# Patient Record
Sex: MALE | Race: WHITE
[De-identification: names, ages, dates, MRNs, and addresses within clinical notes are randomized per-mention and may not be internally consistent; named-entity substitution may affect disease eponyms.]

---

## 2018-02-02 ENCOUNTER — HOSPITAL ENCOUNTER (OUTPATIENT)
Dept: HOSPITAL 45 - C.MRIBC | Age: 78
Discharge: HOME | End: 2018-02-02
Attending: PSYCHIATRY & NEUROLOGY
Payer: COMMERCIAL

## 2018-02-02 DIAGNOSIS — R41.3: Primary | ICD-10-CM

## 2018-02-02 NOTE — DIAGNOSTIC IMAGING REPORT
MRI OF THE BRAIN WITHOUT AND WITH IV CONTRAST



CLINICAL HISTORY: MEMORY LOSS    



COMPARISON STUDY:  No previous studies for comparison. 



TECHNIQUE: MRI of the brain was performed from the vertex to the skull base

utilizing various T1 and T2 weighted sequences. Following the IV administration

of 9.5 mL of Gadavist contrast, additional enhanced images were obtained.



FINDINGS: 

Sagittal T1, axial diffusion, proton density and T2 weighted axial, coronal

FLAIR, and pre and post axial T1-weighted images were acquired. These were

supplemented with post gadolinium coronal T1 weighted images. 

No intra or extra-axial mass lesions are visualized.

Axial diffusion-weighted images reveal no evidence of acute or subacute

infarction.

There is no evidence of ventricular dilatation.

Proton density T2-weighted and FLAIR images reveal minor foci of increased T2

signal within the white matter, likely on a small vessel basis.

There are no abnormal flow voids.

There is mild particular prominence in keeping with the degree of volume loss.





IMPRESSION:  No acute intracranial findings. No evidence of intracranial mass.

No evidence of acute or subacute infarction.







Electronically signed by:  Bang Ewing M.D.

2/2/2018 8:44 AM



Dictated Date/Time:  2/2/2018 8:42 AM

## 2018-05-05 ENCOUNTER — HOSPITAL ENCOUNTER (INPATIENT)
Dept: HOSPITAL 45 - C.EDB | Age: 78
LOS: 4 days | Discharge: NURSING, REHAB OR LONG-TERM CARE | DRG: 884 | End: 2018-05-09
Attending: HOSPITALIST | Admitting: FAMILY MEDICINE
Payer: COMMERCIAL

## 2018-05-05 VITALS
BODY MASS INDEX: 27.41 KG/M2 | HEIGHT: 73 IN | BODY MASS INDEX: 27.41 KG/M2 | WEIGHT: 206.79 LBS | WEIGHT: 206.79 LBS | HEIGHT: 73 IN

## 2018-05-05 DIAGNOSIS — I10: ICD-10-CM

## 2018-05-05 DIAGNOSIS — Z79.899: ICD-10-CM

## 2018-05-05 DIAGNOSIS — F03.91: Primary | ICD-10-CM

## 2018-05-05 DIAGNOSIS — E78.5: ICD-10-CM

## 2018-05-05 DIAGNOSIS — E03.9: ICD-10-CM

## 2018-05-05 DIAGNOSIS — R45.1: ICD-10-CM

## 2018-05-05 DIAGNOSIS — R33.9: ICD-10-CM

## 2018-05-05 DIAGNOSIS — Z59.1: ICD-10-CM

## 2018-05-05 DIAGNOSIS — Z91.83: ICD-10-CM

## 2018-05-05 LAB
ALBUMIN SERPL-MCNC: 3.8 GM/DL (ref 3.4–5)
ALP SERPL-CCNC: 87 U/L (ref 45–117)
ALT SERPL-CCNC: 22 U/L (ref 12–78)
AST SERPL-CCNC: 26 U/L (ref 15–37)
BASOPHILS # BLD: 0.02 K/UL (ref 0–0.2)
BASOPHILS NFR BLD: 0.3 %
BUN SERPL-MCNC: 11 MG/DL (ref 7–18)
CALCIUM SERPL-MCNC: 8.4 MG/DL (ref 8.5–10.1)
CO2 SERPL-SCNC: 28 MMOL/L (ref 21–32)
CREAT SERPL-MCNC: 1.08 MG/DL (ref 0.6–1.4)
EOS ABS #: 0.16 K/UL (ref 0–0.5)
EOSINOPHIL NFR BLD AUTO: 196 K/UL (ref 130–400)
GLUCOSE SERPL-MCNC: 119 MG/DL (ref 70–99)
HCT VFR BLD CALC: 41 % (ref 42–52)
HGB BLD-MCNC: 14 G/DL (ref 14–18)
IG#: 0.01 K/UL (ref 0–0.02)
IMM GRANULOCYTES NFR BLD AUTO: 16.5 %
LYMPHOCYTES # BLD: 1.16 K/UL (ref 1.2–3.4)
MCH RBC QN AUTO: 31.7 PG (ref 25–34)
MCHC RBC AUTO-ENTMCNC: 34.1 G/DL (ref 32–36)
MCV RBC AUTO: 92.8 FL (ref 80–100)
MONO ABS #: 0.79 K/UL (ref 0.11–0.59)
MONOCYTES NFR BLD: 11.3 %
NEUT ABS #: 4.88 K/UL (ref 1.4–6.5)
NEUTROPHILS # BLD AUTO: 2.3 %
NEUTROPHILS NFR BLD AUTO: 69.5 %
PMV BLD AUTO: 10.6 FL (ref 7.4–10.4)
POTASSIUM SERPL-SCNC: 3.5 MMOL/L (ref 3.5–5.1)
PROT SERPL-MCNC: 7.2 GM/DL (ref 6.4–8.2)
RED CELL DISTRIBUTION WIDTH CV: 12.9 % (ref 11.5–14.5)
RED CELL DISTRIBUTION WIDTH SD: 44.1 FL (ref 36.4–46.3)
SODIUM SERPL-SCNC: 139 MMOL/L (ref 136–145)
WBC # BLD AUTO: 7.02 K/UL (ref 4.8–10.8)

## 2018-05-05 SDOH — ECONOMIC STABILITY - HOUSING INSECURITY: INADEQUATE HOUSING: Z59.1

## 2018-05-05 NOTE — DIAGNOSTIC IMAGING REPORT
HEAD WITHOUT CONTRAST (CT)



CLINICAL HISTORY: 77 years-old Male presenting with AMS. 



TECHNIQUE: Multidetector CT imaging of the head was performed without the use of

intravenous contrast. IV contrast: None. A dose lowering technique was used

consistent with the principles of ALARA (as low as reasonably achievable). 



COMPARISON: MR brain from 2/2/2018.



CT DOSE (mGy.cm): The estimated cumulative dose is 687.98 mGy.cm.



FINDINGS: 



 topogram: Unremarkable.



Proportional ventricular and sulcal prominence, likely age-related parenchymal

volume loss. Brain parenchyma normal in appearance with preserved gray-white

differentiation. No mass effect or midline shift. No hemorrhage or acute

territorial infarct. No extra-axial fluid collection. Paranasal sinuses and

mastoid air cells clear. Calvarium intact.    



IMPRESSION:

1.  No acute intracranial abnormality. 







Electronically signed by:  Shaheed Richards M.D.

5/5/2018 7:51 PM



Dictated Date/Time:  5/5/2018 7:49 PM

## 2018-05-05 NOTE — EMERGENCY ROOM VISIT NOTE
History


Report prepared by Ewa:  Shreya Sullivan


Under the Supervision of:  Dr. Cinthia Sexton M.D.


First contact with patient:  17:58


Chief Complaint:  OTHER COMPLAINT


Stated Complaint:  EAR ACHE/PLACEMENT ISSUES





History of Present Illness


The patient is a 77 year old male who presents to the Emergency Room with 

complaints of an episode of placement issues occurring prior to arrival. Per 

the charge nurse, the patient's wife told her over the phone that he has 

dementia, worsening over 4 years. She states that the wife told her that she is 

on vacation for a few weeks in [place]. She reports that the wife said that she 

has cameras throughout the house to watch the patient each day. She states that 

the wife reported the patient goes to the [employer] every day and 

her brother in law checks up on him daily. Reportedly meals are delivered to 

him by his brother-in-law or neighbor twice daily. The charge nurse states that 

the wife said it usual for him to not know where he lives and only his name. 

She states that the wife reported that the patient had a MRI done several 

months ago that showed no significant changes and no improvement. She reports 

that the wife states that she will call the brother-in-law to come and get him. 

The charge nurse reports that the patient was found wandering in the street. 

The patient states that he feels good and nothing happened today. He states 

that it was a good day. He reports that he does not remember wandering the 

streets, but does remember talking to someone across the road. He notes that 

his wife is in "[place]."





   Source of History:  patient, nursing staff


   History Limited By:  dementia


   Onset:  prior to arrival


   Position:  other (global)


   Quality:  other (placement issues)


   Timing:  other (episode)





Review of Systems


See HPI for pertinent positives & negatives. A total of 10 systems reviewed and 

were otherwise negative.





Past Medical & Surgical


Medical Problems:


(1) History of dementia


(2) uti urinry retension, 


(3) uti urinry retension, 








Family History





Patient reports no known family medical history.





Social History


Smoking Status:  Never Smoker


Marital Status:  


Housing Status:  lives with significant other


Occupation Status:  retired





Current/Historical Medications


Scheduled


Amlodipine (Norvasc), 5 MG PO DAILY


Atorvastatin (Lipitor), 20 MG PO DAILY


Furosemide (Lasix), 40 MG PO DAILY


Tamsulosin Hcl (Flomax), 0.4 MG PO DAILY





Allergies


Coded Allergies:  


     No Known Allergies (Unverified , 5/5/18)





Physical Exam


Vital Signs











  Date Time  Temp Pulse Resp B/P (MAP) Pulse Ox O2 Delivery O2 Flow Rate FiO2


 


5/5/18 22:14  64 18 205/106 97 Room Air  


 


5/5/18 20:06  64      


 


5/5/18 19:55  62 18 179/102 99 Room Air  


 


5/5/18 19:03  68 18 225/118 97 Room Air  


 


5/5/18 17:15 36.6 72 16 204/107 96 Room Air  











Physical Exam


Vital signs reviewed.





General: urinating the garbage can, otherwise well-appearing, in no significant 

distress.





HEENT: No scleral icterus, PERRLA, neck supple.  Atraumatic.





Cardiovascular: Regular rate and rhythm, no extra sounds.





Pulmonary: Clear to auscultation bilaterally, normal work of breathing.





Abdomen: Soft, nontender, nondistended, positive bowel sounds.





Musculoskeletal: Atraumatic, no peripheral edema.





Neurologic: Pleasantly confused.  Oriented only to person.  Follows commands. 

Unable to give details preceding visit. Denies any complaints at this time.





Skin: Warm, dry, no rash





Medical Decision & Procedures


ER Provider


Diagnostic Interpretation:


Radiology results as stated below per my review and radiologist interpretation:





HEAD WITHOUT CONTRAST (CT)





CLINICAL HISTORY: 77 years-old Male presenting with AMS. 





TECHNIQUE: Multidetector CT imaging of the head was performed without the use of


intravenous contrast. IV contrast: None. A dose lowering technique was used


consistent with the principles of ALARA (as low as reasonably achievable). 





COMPARISON: MR brain from 2/2/2018.





CT DOSE (mGy.cm): The estimated cumulative dose is 687.98 mGy.cm.





FINDINGS: 





 topogram: Unremarkable.





Proportional ventricular and sulcal prominence, likely age-related parenchymal


volume loss. Brain parenchyma normal in appearance with preserved gray-white


differentiation. No mass effect or midline shift. No hemorrhage or acute


territorial infarct. No extra-axial fluid collection. Paranasal sinuses and


mastoid air cells clear. Calvarium intact.    





IMPRESSION:


1.  No acute intracranial abnormality. 











Electronically signed by:  Shaheed Richards M.D.


5/5/2018 7:51 PM





Dictated Date/Time:  5/5/2018 7:49 PM





CHEST 2 VIEWS ROUTINE





CLINICAL HISTORY: 77 years-old Male presenting with AMS. 





TECHNIQUE: PA and lateral views of the chest were obtained.





COMPARISON: None.





FINDINGS:


Atherosclerosis of the aortic arch. Tortuosity of the descending thoracic aorta.


Cardiac silhouette normal in size. Lungs and pleural spaces clear. Degenerative


changes of the thoracic spine. Upper abdomen normal.





IMPRESSION:


1.  No acute cardiopulmonary disease.











Electronically signed by:  Shaheed Richards M.D.


5/5/2018 9:05 PM





Dictated Date/Time:  5/5/2018 9:04 PM





Laboratory Results


5/5/18 18:46








Red Blood Count 4.42, Mean Corpuscular Volume 92.8, Mean Corpuscular Hemoglobin 

31.7, Mean Corpuscular Hemoglobin Concent 34.1, Mean Platelet Volume 10.6, 

Neutrophils (%) (Auto) 69.5, Lymphocytes (%) (Auto) 16.5, Monocytes (%) (Auto) 

11.3, Eosinophils (%) (Auto) 2.3, Basophils (%) (Auto) 0.3, Neutrophils # (Auto

) 4.88, Lymphocytes # (Auto) 1.16, Monocytes # (Auto) 0.79, Eosinophils # (Auto

) 0.16, Basophils # (Auto) 0.02





5/5/18 18:46

















Test


  5/5/18


17:18 5/5/18


18:46


 


Urine Color YELLOW  


 


Urine Appearance CLEAR (CLEAR)  


 


Urine pH 7.5 (4.5-7.5)  


 


Urine Specific Gravity


  1.009


(1.000-1.030) 


 


 


Urine Protein NEG (NEG)  


 


Urine Glucose (UA) NEG (NEG)  


 


Urine Ketones NEG (NEG)  


 


Urine Occult Blood NEG (NEG)  


 


Urine Nitrite NEG (NEG)  


 


Urine Bilirubin NEG (NEG)  


 


Urine Urobilinogen NEG (NEG)  


 


Urine Leukocyte Esterase MODERATE (NEG)  


 


Urine WBC (Auto) >30 /hpf (0-5)  


 


Urine RBC (Auto) 0-4 /hpf (0-4)  


 


Urine Hyaline Casts (Auto) 1-5 /lpf (0-5)  


 


Urine Epithelial Cells (Auto) 0-5 /lpf (0-5)  


 


Urine Bacteria (Auto) NEG (NEG)  


 


White Blood Count


  


  7.02 K/uL


(4.8-10.8)


 


Red Blood Count


  


  4.42 M/uL


(4.7-6.1)


 


Hemoglobin


  


  14.0 g/dL


(14.0-18.0)


 


Hematocrit  41.0 % (42-52) 


 


Mean Corpuscular Volume


  


  92.8 fL


()


 


Mean Corpuscular Hemoglobin


  


  31.7 pg


(25-34)


 


Mean Corpuscular Hemoglobin


Concent 


  34.1 g/dl


(32-36)


 


Platelet Count


  


  196 K/uL


(130-400)


 


Mean Platelet Volume


  


  10.6 fL


(7.4-10.4)


 


Neutrophils (%) (Auto)  69.5 % 


 


Lymphocytes (%) (Auto)  16.5 % 


 


Monocytes (%) (Auto)  11.3 % 


 


Eosinophils (%) (Auto)  2.3 % 


 


Basophils (%) (Auto)  0.3 % 


 


Neutrophils # (Auto)


  


  4.88 K/uL


(1.4-6.5)


 


Lymphocytes # (Auto)


  


  1.16 K/uL


(1.2-3.4)


 


Monocytes # (Auto)


  


  0.79 K/uL


(0.11-0.59)


 


Eosinophils # (Auto)


  


  0.16 K/uL


(0-0.5)


 


Basophils # (Auto)


  


  0.02 K/uL


(0-0.2)


 


RDW Standard Deviation


  


  44.1 fL


(36.4-46.3)


 


RDW Coefficient of Variation


  


  12.9 %


(11.5-14.5)


 


Immature Granulocyte % (Auto)  0.1 % 


 


Immature Granulocyte # (Auto)


  


  0.01 K/uL


(0.00-0.02)


 


Anion Gap


  


  3.0 mmol/L


(3-11)


 


Estimated GFR (


American) 


  77.4 


 


 


Estimated GFR (Non-


American 


  66.8 


 


 


BUN/Creatinine Ratio  10.2 (10-20) 


 


Calcium Level


  


  8.4 mg/dl


(8.5-10.1)


 


Magnesium Level


  


  2.3 mg/dl


(1.8-2.4)


 


Total Bilirubin


  


  0.6 mg/dl


(0.2-1)


 


Direct Bilirubin


  


  0.2 mg/dl


(0-0.2)


 


Aspartate Amino Transf


(AST/SGOT) 


  26 U/L (15-37) 


 


 


Alanine Aminotransferase


(ALT/SGPT) 


  22 U/L (12-78) 


 


 


Alkaline Phosphatase


  


  87 U/L


()


 


Total Creatine Kinase


  


  340 U/L


()


 


Troponin I


  


  < 0.015 ng/ml


(0-0.045)


 


Total Protein


  


  7.2 gm/dl


(6.4-8.2)


 


Albumin


  


  3.8 gm/dl


(3.4-5.0)





Laboratory results per my review.





Medications Administered











 Medications


  (Trade)  Dose


 Ordered  Sig/Elder


 Route  Start Time


 Stop Time Status Last Admin


Dose Admin


 


 Ceftriaxone Sodium


  (Rocephin Inj)  1 gm  NOW  STAT


 IV  5/5/18 20:00


 5/5/18 20:01 DC 5/5/18 20:23


1 GM











ECG Per My Interpretation


Indication:  altered mental status


Rate (beats per minute):  62


Rhythm:  normal sinus


Findings:  no acute ischemic change, other (qt-c [id number])





ED Course


1804: Past medical records reviewed. The patient was evaluated in room B11A. A 

complete history and physical examination was performed. 





2000: Ordered Rocephin Inj 1 gm IV. 





2001: I reevaluated the patient and he is doing okay.





2038: The office of ageing states that they do not do placements and won't do 

that for this patient because they have no prior cases with them. 





2118: I reviewed the patient's case with Dr. Armand GALAVIZ.  She will evaluate 

the patient for further management.





Medical Decision


Differential diagnosis:


Etiologies such as metabolic, infection, hypoglycemia, electrolyte abnormalities

, cardiac sources, intracerebral event, toxicologic, neurologic, as well as 

others were entertained.





This patient was evaluated and appeared to be in no significant distress.  

Physical examination is fairly unrevealing.  The patient is fairly confused.  

Nursing staff has been in contact with the patient's wife in [place] several 

times.  Neighbors have arrived at the patient's bedside, but they state they 

are not intimately involved with the patient.  Patient's urinalysis is 

concerning for infection will be sent for culture.  He was given 1 g of IV 

ceftriaxone.  [facility] was contacted but they are unable to arrange 

respite care tonight.  Our  did contact several nursing facilities 

but as it is Saturday night, they were unsuccessful in making arrangements.  

The hospitalist, Dr. Roach was contacted and will evaluate the patient 

for further management.





Medication Reconcilliation


Current Medication List:  was personally reviewed by me





Blood Pressure Screening


Patient's blood pressure:  Elevated blood pressure


Will be further monitored by the hospitalist.





Consults


Time Called:  2112


Consulting Physician:  Dr. Armand GALAVIZ


Returned Call:  [phone]


I reviewed the patient's case with Dr. Armand GALAVIZ.  She will evaluate the 

patient for further management.





Impression





 Primary Impression:  


 Dementia


 Additional Impressions:  


 UTI (urinary tract infection)


 Unsatisfactory living conditions





Scribe Attestation


The scribe's documentation has been prepared under my direction and personally 

reviewed by me in its entirety. I confirm that the note above accurately 

reflects all work, treatment, procedures, and medical decision making performed 

by me.





Departure Information


Dispostion


Being Evaluated By Hospitalist





Referrals


Abundio Frost D.O. (PCP)





Patient Instructions


My [facility]





Problem Qualifiers

## 2018-05-05 NOTE — HISTORY AND PHYSICAL
History & Physical


Date & Time of Service:


May 5, 2018 at 23:07


Chief Complaint:


Ear Ache/Placement Issues


Primary Care Physician:


Abundio Frost D.OJeevan


History of Present Illness


Source:  clinic records, hospital records


77-year-old male with a past medical history of hypertension, hypothyroidism, 

dementia was brought to the ER after he was found wandering on the streets.


The patient has a history of dementia and is not oriented to time or place.  He 

is only oriented to self.


Per ER notes, the patient's wife who is currently in [place] had spoken to the 

charge nurse and stated that the patient has a history of dementia.  Apparently

, she is in [place] for vacation and had set up cameras in her home to check on 

her .  She had meals delivered to him by his brother-in-law or neighbor 

and had somebody check on him every day.


The patient is unable to answer any questions as to where he is or where his 

wife is.





Past Medical/Surgical History


Medical Problems:


(1) History of dementia








Family History





Patient reports no known family medical history.





Social History


Smoking Status:  Never Smoker


Marital Status:  


Occupational Status:  retired





Immunizations


History of Influenza Vaccine:  Unknown


History of Tetanus Vaccine?:  Unknown


History of Pneumococcal:  Unknown


History of Hepatitis B Vaccine:  Unknown





Allergies


Coded Allergies:  


     No Known Allergies (Unverified , 5/5/18)





Home Medications


Scheduled


Amlodipine (Norvasc), 5 MG PO DAILY


Atorvastatin (Lipitor), 20 MG PO DAILY


Furosemide (Lasix), 40 MG PO DAILY


Tamsulosin Hcl (Flomax), 0.4 MG PO DAILY





Review of Systems


Constitutional:  No fever, No chills


Eyes:  No worsening of vision


ENT:  No hearing loss


Respiratory:  No cough


Cardiovascular:  No chest pain


Abdomen:  No pain, No nausea, No vomiting


Musculoskeletal:  No joint pain


Genitourinary - Male:  No hematuria, No dysuria


Neurologic:  + memory loss


Psychiatric:  No depression symptoms


Endocrine:  No fatigue


Hematologic / Lymphatic:  No abnormal bleeding/bruising


Integumentary:  No rash





Physical Exam


Vital Signs











  Date Time  Temp Pulse Resp B/P (MAP) Pulse Ox O2 Delivery O2 Flow Rate FiO2


 


5/5/18 22:14  64 18 205/106 97 Room Air  


 


5/5/18 20:06  64      


 


5/5/18 19:55  62 18 179/102 99 Room Air  


 


5/5/18 19:03  68 18 225/118 97 Room Air  


 


5/5/18 17:15 36.6 72 16 204/107 96 Room Air  








General Appearance:  WD/WN, no apparent distress


ENT:  hearing grossly normal


Neck:  supple


Respiratory/Chest:  lungs clear, normal breath sounds, no respiratory distress


Cardiovascular:  regular rate, rhythm


Abdomen/GI:  normal bowel sounds, non tender, soft


Extremities/Musculoskelatal:  no pedal edema


Neurologic/Psych:  alert


Skin:  normal color





Diagnostics


Laboratory Results





Results Past 24 Hours








Test


  5/5/18


17:18 5/5/18


18:46 Range/Units


 


 


Urine Color YELLOW   


 


Urine Appearance CLEAR  CLEAR  


 


Urine pH 7.5  4.5-7.5  


 


Urine Specific Gravity 1.009  1.000-1.030  


 


Urine Protein NEG  NEG  


 


Urine Glucose (UA) NEG  NEG  


 


Urine Ketones NEG  NEG  


 


Urine Occult Blood NEG  NEG  


 


Urine Nitrite NEG  NEG  


 


Urine Bilirubin NEG  NEG  


 


Urine Urobilinogen NEG  NEG  


 


Urine Leukocyte Esterase MODERATE  NEG  


 


Urine WBC (Auto) >30  0-5  /hpf


 


Urine RBC (Auto) 0-4  0-4  /hpf


 


Urine Hyaline Casts (Auto) 1-5  0-5  /lpf


 


Urine Epithelial Cells (Auto) 0-5  0-5  /lpf


 


Urine Bacteria (Auto) NEG  NEG  


 


White Blood Count  7.02 4.8-10.8  K/uL


 


Red Blood Count  4.42 4.7-6.1  M/uL


 


Hemoglobin  14.0 14.0-18.0  g/dL


 


Hematocrit  41.0 42-52  %


 


Mean Corpuscular Volume  92.8   fL


 


Mean Corpuscular Hemoglobin  31.7 25-34  pg


 


Mean Corpuscular Hemoglobin


Concent 


  34.1


  32-36  g/dl


 


 


Platelet Count  196 130-400  K/uL


 


Mean Platelet Volume  10.6 7.4-10.4  fL


 


Neutrophils (%) (Auto)  69.5  %


 


Lymphocytes (%) (Auto)  16.5  %


 


Monocytes (%) (Auto)  11.3  %


 


Eosinophils (%) (Auto)  2.3  %


 


Basophils (%) (Auto)  0.3  %


 


Neutrophils # (Auto)  4.88 1.4-6.5  K/uL


 


Lymphocytes # (Auto)  1.16 1.2-3.4  K/uL


 


Monocytes # (Auto)  0.79 0.11-0.59  K/uL


 


Eosinophils # (Auto)  0.16 0-0.5  K/uL


 


Basophils # (Auto)  0.02 0-0.2  K/uL


 


RDW Standard Deviation  44.1 36.4-46.3  fL


 


RDW Coefficient of Variation  12.9 11.5-14.5  %


 


Immature Granulocyte % (Auto)  0.1  %


 


Immature Granulocyte # (Auto)  0.01 0.00-0.02  K/uL


 


Sodium Level  139 136-145  mmol/L


 


Potassium Level  3.5 3.5-5.1  mmol/L


 


Chloride Level  108   mmol/L


 


Carbon Dioxide Level  28 21-32  mmol/L


 


Anion Gap  3.0 3-11  mmol/L


 


Blood Urea Nitrogen  11 7-18  mg/dl


 


Creatinine


  


  1.08


  0.60-1.40


mg/dl


 


Estimated GFR (


American) 


  77.4


   


 


 


Estimated GFR (Non-


American 


  66.8


   


 


 


BUN/Creatinine Ratio  10.2 10-20  


 


Random Glucose  119 70-99  mg/dl


 


Calcium Level  8.4 8.5-10.1  mg/dl


 


Magnesium Level  2.3 1.8-2.4  mg/dl


 


Total Bilirubin  0.6 0.2-1  mg/dl


 


Direct Bilirubin  0.2 0-0.2  mg/dl


 


Aspartate Amino Transf


(AST/SGOT) 


  26


  15-37  U/L


 


 


Alanine Aminotransferase


(ALT/SGPT) 


  22


  12-78  U/L


 


 


Alkaline Phosphatase  87   U/L


 


Total Creatine Kinase  340   U/L


 


Troponin I  < 0.015 0-0.045  ng/ml


 


Total Protein  7.2 6.4-8.2  gm/dl


 


Albumin  3.8 3.4-5.0  gm/dl








Microbiology Results


5/5/18 Urine Culture, Received


         Pending





Diagnostic Radiology


[~ rep ct add3]]


HEAD WITHOUT CONTRAST (CT)





CLINICAL HISTORY: 77 years-old Male presenting with AMS. 





TECHNIQUE: Multidetector CT imaging of the head was performed without the use of


intravenous contrast. IV contrast: None. A dose lowering technique was used


consistent with the principles of ALARA (as low as reasonably achievable). 





COMPARISON: MR brain from 2/2/2018.





CT DOSE (mGy.cm): The estimated cumulative dose is 687.98 mGy.cm.





FINDINGS: 





 topogram: Unremarkable.





Proportional ventricular and sulcal prominence, likely age-related parenchymal


volume loss. Brain parenchyma normal in appearance with preserved gray-white


differentiation. No mass effect or midline shift. No hemorrhage or acute


territorial infarct. No extra-axial fluid collection. Paranasal sinuses and


mastoid air cells clear. Calvarium intact.    





IMPRESSION:


1.  No acute intracranial abnormality. 











Electronically signed by:  Shaheed Richards M.D.


5/5/2018 7:51 PM





Dictated Date/Time:  5/5/2018 7:49 PM





CHEST 2 VIEWS ROUTINE





CLINICAL HISTORY: 77 years-old Male presenting with AMS. 





TECHNIQUE: PA and lateral views of the chest were obtained.





COMPARISON: None.





FINDINGS:


Atherosclerosis of the aortic arch. Tortuosity of the descending thoracic aorta.


Cardiac silhouette normal in size. Lungs and pleural spaces clear. Degenerative


changes of the thoracic spine. Upper abdomen normal.





IMPRESSION:


1.  No acute cardiopulmonary disease.











Electronically signed by:  Shaheed Richards M.D.


5/5/2018 9:05 PM





Dictated Date/Time:  5/5/2018 9:04 PM





EKG


Normal sinus rhythm


Normal ECG


No previous ECGs available





Impression


Assessment and Plan


77-year-old male with a past medical history of hypertension, hypothyroidism, 

dementia presented to the ER after he was found wandering the streets.


He was apparently alone at home while his wife is in [place].


He is being admitted for possible placement as he is unable to care for himself.





Dementia:


-Per clinic records, patient had seen Dr. Douglas in the past and has not 

tolerated Namenda or donepezil.


He is currently not on any medication





Hypertension:


-Continue Norvasc





Hypothyroidism:


-Continue Synthroid 





  consult  to help with placement


Unable to obtain resuscitation status from patient considering history of 

dementia disposition





Disposition: admitted to [facility]








Attending addendum:








I have physically seen this patient, have supervised the medical residents 

activities, and agree with the H&P unless as otherwise noted.





Assessment and Plan:





Dementia/patient found wandering in the streets/wife away on vacation in [place]-

-


The patient's dementia is significant enough that I would not recommend him 

being left alone for any significant length of time, certainly not more than a 

few hours.


The office of aging has been contacted.


 has been consulted to help with discharge.


The patient has been seen by Dr. Douglas in the past, and has not tolerated 

treatment with with Namenda or donepezil.


Consult Dr. Douglas.





Hypertension--


Continue amlodipine.





Hypothyroidism--


Continue levothyroxine





Advanced Directives


Existing Advance Directive:  No


Existing Living Will:  No





Resuscitation Status








VTE Prophylaxis


Will order VTE Prophylaxis:  Yes





Social Service Consult


Abuse/Neglect Concerns





Resident Tracking


Resident Involvement:  Resident Care Provided


Care Provided:  Adult Hospital Medicine

## 2018-05-05 NOTE — DIAGNOSTIC IMAGING REPORT
CHEST 2 VIEWS ROUTINE



CLINICAL HISTORY: 77 years-old Male presenting with AMS. 



TECHNIQUE: PA and lateral views of the chest were obtained.



COMPARISON: None.



FINDINGS:

Atherosclerosis of the aortic arch. Tortuosity of the descending thoracic aorta.

Cardiac silhouette normal in size. Lungs and pleural spaces clear. Degenerative

changes of the thoracic spine. Upper abdomen normal.



IMPRESSION:

1.  No acute cardiopulmonary disease.







Electronically signed by:  Shaheed Richards M.D.

5/5/2018 9:05 PM



Dictated Date/Time:  5/5/2018 9:04 PM

## 2018-05-06 VITALS
HEART RATE: 60 BPM | TEMPERATURE: 98.78 F | DIASTOLIC BLOOD PRESSURE: 93 MMHG | OXYGEN SATURATION: 95 % | SYSTOLIC BLOOD PRESSURE: 168 MMHG

## 2018-05-06 VITALS
HEART RATE: 57 BPM | DIASTOLIC BLOOD PRESSURE: 91 MMHG | SYSTOLIC BLOOD PRESSURE: 152 MMHG | OXYGEN SATURATION: 97 % | TEMPERATURE: 97.7 F

## 2018-05-06 VITALS — SYSTOLIC BLOOD PRESSURE: 180 MMHG | HEART RATE: 60 BPM | DIASTOLIC BLOOD PRESSURE: 79 MMHG

## 2018-05-06 VITALS
TEMPERATURE: 97.52 F | HEART RATE: 64 BPM | SYSTOLIC BLOOD PRESSURE: 168 MMHG | OXYGEN SATURATION: 95 % | DIASTOLIC BLOOD PRESSURE: 82 MMHG

## 2018-05-06 VITALS
OXYGEN SATURATION: 94 % | HEART RATE: 69 BPM | DIASTOLIC BLOOD PRESSURE: 82 MMHG | SYSTOLIC BLOOD PRESSURE: 152 MMHG | TEMPERATURE: 98.78 F

## 2018-05-06 VITALS
DIASTOLIC BLOOD PRESSURE: 99 MMHG | OXYGEN SATURATION: 96 % | SYSTOLIC BLOOD PRESSURE: 200 MMHG | HEART RATE: 65 BPM | TEMPERATURE: 98.06 F

## 2018-05-06 RX ADMIN — ATORVASTATIN CALCIUM SCH MG: 20 TABLET, FILM COATED ORAL at 09:04

## 2018-05-06 RX ADMIN — FUROSEMIDE SCH MG: 40 TABLET ORAL at 09:04

## 2018-05-06 RX ADMIN — ACETAMINOPHEN PRN MG: 325 TABLET ORAL at 23:01

## 2018-05-06 RX ADMIN — TAMSULOSIN HYDROCHLORIDE SCH MG: 0.4 CAPSULE ORAL at 09:04

## 2018-05-06 NOTE — PROGRESS NOTE
Subjective


Date of Service:


May 6, 2018.


Subjective


Pt evaluation today including:  conversation w/ patient, physical exam, chart 

review, lab review, review of studies, conversation w/ consultant, review of 

inpatient medication list


Pleasant, conversational, awake alert orientated to name, bd,  not to place, 

cannot remember wife's name, no other complaint





Problem List


Medical Problems:


(1) Dementia


Status: Acute  





(2) Unsatisfactory living conditions


Status: Acute  





(3) UTI (urinary tract infection)


Status: Acute  








Review of Systems


Constitutional:  + weakness, + fatigue, + problem reported (Limited because of 

patient possible has dementia), No fever, No chills, No sweats, No weight loss


Eyes:  No worsening of vision, No eye pain, No redness, No discharge, No 

diplopia


ENT:  No hearing loss, No unusual epistaxis, No nasal symptoms, No sore throat, 

No tinnitus, No dental problems, No trouble swallowing


Respiratory:  No cough, No sputum, No wheezing, No shortness of breath, No 

dyspnea on exertion, No dyspnea at rest, No hemoptysis


Cardiac:  No chest pain, No orthopnea, No PND, No edema, No claudication, No 

palpitations


Abdomen:  No pain, No nausea, No vomiting, No diarrhea, No constipation


Musculoskeletal:  No joint pain, No muscle pain, No swelling, No calf pain


Male :  No dysuria, No urinary frequency, No incontinence, No nocturia more 

than once/night, No slowing stream, No hematuria


Neurologic:  No memory loss, No paralysis, No weakness, No numbness/tingling, 

No vertigo, No balance problems


Psychiatric:  No depression symptoms, No anhedonism, No anxiety, No insomnia, 

No substance abuse


Heme:  No abnormal bleeding/bruising, No clotting problems, No swollen lymph 

nodes, No night sweats


Endo:  No fatigue, No excessive thirst, No excessive urination


Skin:  No rash, No itch, No new/changing skin lesions, No color change, No 

bleeding





Objective


Vital Signs











  Date Time  Temp Pulse Resp B/P (MAP) Pulse Ox O2 Delivery O2 Flow Rate FiO2


 


5/6/18 16:00      Room Air  


 


5/6/18 15:10 37.1 69 16 152/82 (105) 94 Room Air  


 


5/6/18 10:11  60  180/79 (112)    


 


5/6/18 07:45      Room Air  


 


5/6/18 07:15 36.4 64 16 168/82 (110) 95 Room Air  


 


5/6/18 01:15      Room Air  


 


5/6/18 00:40      Room Air  


 


5/6/18 00:40 36.7 65 20 221/99 (139) 96 Room Air  





    200/114 (142)    


 


5/6/18 00:36  68 18  96   


 


5/5/18 22:14  64 18 205/106 97 Room Air  


 


5/5/18 20:06  64      


 


5/5/18 19:55  62 18 179/102 99 Room Air  


 


5/5/18 19:03  68 18 225/118 97 Room Air  


 


5/5/18 17:15 36.6 72 16 204/107 96 Room Air  











Physical Exam


General Appearance:  WD/WN, no apparent distress, + thin, + pertinent finding (

Pleasant but confused)


Eyes:  normal inspection, PERRL, EOMI, sclerae normal


ENT:  normal ENT inspection, hearing grossly normal, pharynx normal


Neck:  supple, no adenopathy, thyroid normal, no JVD, no carotid bruits, 

trachea midline


Respiratory/Chest:  chest non-tender, normal breath sounds, no respiratory 

distress, no accessory muscle use, + decreased breath sounds


Cardiovascular:  regular rate, rhythm, no edema, no gallop, no JVD, no murmur


Abdomen:  normal bowel sounds, non tender, soft, no organomegaly, no pulsatile 

mass


Extremities:  normal range of motion, non-tender, normal inspection, no pedal 

edema, no calf tenderness, normal capillary refill, pelvis stable


Neurologic/Psychiatric:  CNs II-XII nml as tested, no motor/sensory deficits, 

alert, normal mood/affect, oriented x 3


Skin:  normal color, warm/dry, no rash


Lymphatic:  no adenopathy





Laboratory Results





Last 24 Hours








Test


  5/5/18


17:18 5/5/18


18:46


 


Urine Color YELLOW  


 


Urine Appearance CLEAR  


 


Urine pH 7.5  


 


Urine Specific Gravity 1.009  


 


Urine Protein NEG  


 


Urine Glucose (UA) NEG  


 


Urine Ketones NEG  


 


Urine Occult Blood NEG  


 


Urine Nitrite NEG  


 


Urine Bilirubin NEG  


 


Urine Urobilinogen NEG  


 


Urine Leukocyte Esterase MODERATE  


 


Urine WBC (Auto) >30 /hpf  


 


Urine RBC (Auto) 0-4 /hpf  


 


Urine Hyaline Casts (Auto) 1-5 /lpf  


 


Urine Epithelial Cells (Auto) 0-5 /lpf  


 


Urine Bacteria (Auto) NEG  


 


White Blood Count  7.02 K/uL 


 


Red Blood Count  4.42 M/uL 


 


Hemoglobin  14.0 g/dL 


 


Hematocrit  41.0 % 


 


Mean Corpuscular Volume  92.8 fL 


 


Mean Corpuscular Hemoglobin  31.7 pg 


 


Mean Corpuscular Hemoglobin


Concent 


  34.1 g/dl 


 


 


Platelet Count  196 K/uL 


 


Mean Platelet Volume  10.6 fL 


 


Neutrophils (%) (Auto)  69.5 % 


 


Lymphocytes (%) (Auto)  16.5 % 


 


Monocytes (%) (Auto)  11.3 % 


 


Eosinophils (%) (Auto)  2.3 % 


 


Basophils (%) (Auto)  0.3 % 


 


Neutrophils # (Auto)  4.88 K/uL 


 


Lymphocytes # (Auto)  1.16 K/uL 


 


Monocytes # (Auto)  0.79 K/uL 


 


Eosinophils # (Auto)  0.16 K/uL 


 


Basophils # (Auto)  0.02 K/uL 


 


RDW Standard Deviation  44.1 fL 


 


RDW Coefficient of Variation  12.9 % 


 


Immature Granulocyte % (Auto)  0.1 % 


 


Immature Granulocyte # (Auto)  0.01 K/uL 


 


Sodium Level  139 mmol/L 


 


Potassium Level  3.5 mmol/L 


 


Chloride Level  108 mmol/L 


 


Carbon Dioxide Level  28 mmol/L 


 


Anion Gap  3.0 mmol/L 


 


Blood Urea Nitrogen  11 mg/dl 


 


Creatinine  1.08 mg/dl 


 


Estimated GFR (


American) 


  77.4 


 


 


Estimated GFR (Non-


American 


  66.8 


 


 


BUN/Creatinine Ratio  10.2 


 


Random Glucose  119 mg/dl 


 


Calcium Level  8.4 mg/dl 


 


Magnesium Level  2.3 mg/dl 


 


Total Bilirubin  0.6 mg/dl 


 


Direct Bilirubin  0.2 mg/dl 


 


Aspartate Amino Transf


(AST/SGOT) 


  26 U/L 


 


 


Alanine Aminotransferase


(ALT/SGPT) 


  22 U/L 


 


 


Alkaline Phosphatase  87 U/L 


 


Total Creatine Kinase  340 U/L 


 


Troponin I  < 0.015 ng/ml 


 


Total Protein  7.2 gm/dl 


 


Albumin  3.8 gm/dl 











Assessment and Plan


77-year-old male admitted because was found wandering the streets.





Urinary retention requiring Harden catheter UTI





Dementia:


has not tolerated Namenda or donepezil.





Accelerated hypertension hypertension: Continue Norvasc, add hydralazine as 

needed





Hypothyroidism; Continue Synthroid , check TSH





a past medical history of hypertension, hypothyroidism, dementia 





  consult  to help with placement


Unable to obtain resuscitation status from patient considering history of 

dementia disposition





Disposition: PT OT evaluation and  for discharge plan possible like 

need placement , OFFICE of aging involved


Continued [facility] stay due to:  home environment unsafe for pt


Discharge planning:  uncertain

## 2018-05-07 VITALS
SYSTOLIC BLOOD PRESSURE: 161 MMHG | HEART RATE: 58 BPM | DIASTOLIC BLOOD PRESSURE: 89 MMHG | TEMPERATURE: 97.7 F | OXYGEN SATURATION: 93 %

## 2018-05-07 VITALS
TEMPERATURE: 98.6 F | OXYGEN SATURATION: 94 % | DIASTOLIC BLOOD PRESSURE: 70 MMHG | SYSTOLIC BLOOD PRESSURE: 121 MMHG | HEART RATE: 65 BPM

## 2018-05-07 VITALS
OXYGEN SATURATION: 97 % | HEART RATE: 53 BPM | DIASTOLIC BLOOD PRESSURE: 83 MMHG | TEMPERATURE: 97.52 F | SYSTOLIC BLOOD PRESSURE: 150 MMHG

## 2018-05-07 VITALS
OXYGEN SATURATION: 95 % | SYSTOLIC BLOOD PRESSURE: 152 MMHG | TEMPERATURE: 97.7 F | HEART RATE: 65 BPM | DIASTOLIC BLOOD PRESSURE: 82 MMHG

## 2018-05-07 VITALS — OXYGEN SATURATION: 97 %

## 2018-05-07 VITALS — OXYGEN SATURATION: 96 %

## 2018-05-07 LAB
BUN SERPL-MCNC: 16 MG/DL (ref 7–18)
CALCIUM SERPL-MCNC: 8.3 MG/DL (ref 8.5–10.1)
CO2 SERPL-SCNC: 30 MMOL/L (ref 21–32)
CREAT SERPL-MCNC: 1.28 MG/DL (ref 0.6–1.4)
GLUCOSE SERPL-MCNC: 128 MG/DL (ref 70–99)
PHOSPHATE SERPL-MCNC: 3.1 MG/DL (ref 2.5–4.9)
POTASSIUM SERPL-SCNC: 3.7 MMOL/L (ref 3.5–5.1)
SODIUM SERPL-SCNC: 139 MMOL/L (ref 136–145)

## 2018-05-07 RX ADMIN — AMLODIPINE BESYLATE SCH MG: 5 TABLET ORAL at 09:29

## 2018-05-07 RX ADMIN — TAMSULOSIN HYDROCHLORIDE SCH MG: 0.4 CAPSULE ORAL at 09:30

## 2018-05-07 RX ADMIN — FUROSEMIDE SCH MG: 40 TABLET ORAL at 09:30

## 2018-05-07 RX ADMIN — ATORVASTATIN CALCIUM SCH MG: 20 TABLET, FILM COATED ORAL at 09:30

## 2018-05-07 NOTE — HOSPITALIST PROGRESS NOTE
Hospitalist Progress Note


Date of Service


May 7, 2018.





Subjective


Pt evaluation today including:  conversation w/ patient, physical exam, lab 

review, review of studies, review of inpatient medication list


Voiding:  suarez catheter in place


Patient resting in bed. Feeling well. 


Forgetful at this but alert/oriented. 


Does not remember eating breakfast. 





Patient denies any fever, chills, sweats, lightheadedness, dizziness, vision 

changes, CP, palpitations, edema, SOB, wheezing, cough, abdominal pain, nausea, 

vomiting, diarrhea, urinary symptoms, melena, numbness/tingling, weakness, 

muscle/joint pain, anxiety/depression, active bleeding, or new skin 

discoloration/changes. 


   **unsure of reliability due to underlying dementia**





Medications





Current Inpatient Medications








 Medications


  (Trade)  Dose


 Ordered  Sig/Elder


 Route  Start Time


 Stop Time Status Last Admin


Dose Admin


 


 Acetaminophen


  (Tylenol Tab)  650 mg  Q4H  PRN


 PO  5/5/18 23:15


 6/4/18 23:14  5/6/18 23:01


650 MG


 


 Magnesium


 Hydroxide


  (Milk Of


 Magnesia Susp)  30 ml  Q6H  PRN


 PO  5/5/18 23:15


 6/4/18 23:14   


 


 


 Polyethylene


  (Miralax Powder


 Packet)  17 gm  DAILY  PRN


 PO  5/5/18 23:15


 6/4/18 23:14   


 


 


 Ondansetron HCl


  (Zofran Inj)  4 mg  Q6H  PRN


 IV  5/5/18 23:15


 6/4/18 23:14   


 


 


 Atorvastatin


 Calcium


  (Lipitor Tab)  20 mg  DAILY


 PO  5/6/18 09:00


 6/5/18 08:59  5/7/18 09:30


20 MG


 


 Furosemide


  (Lasix Tab)  40 mg  DAILY


 PO  5/6/18 09:00


 6/5/18 08:59  5/7/18 09:30


40 MG


 


 Tamsulosin HCl


  (Flomax Cap)  0.4 mg  DAILY


 PO  5/6/18 09:00


 6/5/18 08:59  5/7/18 09:30


0.4 MG


 


 Ceftriaxone


 Sodium 500 mg/


 Dextrose  55 ml @ 


 100 mls/hr  DAILY@2000


 IV  5/6/18 20:00


 5/14/18 20:32  5/6/18 20:40


100 MLS/HR


 


 Miscellaneous


  (Iv Fluids


 Completed)  1 ea  PRN  PRN


 N/A  5/5/18 23:45


 5/5/19 23:44   


 


 


 Hydralazine HCl


  (HydrALAZINE INJ)  20 mg  Q6  PRN


 IV.  5/6/18 07:45


 6/5/18 07:44   


 


 


 Amlodipine


 Besylate


  (Norvasc Tab)  10 mg  DAILY


 PO  5/7/18 09:00


 6/5/18 08:59  5/7/18 09:29


10 MG











Objective


Vital Signs











  Date Time  Temp Pulse Resp B/P (MAP) Pulse Ox O2 Delivery O2 Flow Rate FiO2


 


5/7/18 08:00      Room Air  


 


5/7/18 07:23 36.5 58 16 161/89 (113) 93 Room Air  


 


5/7/18 00:05     96 Room Air  


 


5/6/18 23:02 37.1 60 16 168/93 (118) 95 Room Air  


 


5/6/18 22:50 36.5 57 20 152/91 (111) 97 Room Air  


 


5/6/18 19:15      Room Air  


 


5/6/18 16:00      Room Air  


 


5/6/18 15:10 37.1 69 16 152/82 (105) 94 Room Air  











Physical Exam


General Appearance:  no apparent distress


Eyes:  normal inspection, PERRL


ENT:  hearing grossly normal


Neck:  supple


Respiratory/Chest:  lungs clear, no respiratory distress, no accessory muscle 

use, + decreased breath sounds (throughout)


Cardiovascular:  regular rate, rhythm


Abdomen:  normal bowel sounds, non tender, soft


Extremities:  no pedal edema, no calf tenderness


Neurologic/Psychiatric:  alert, normal mood/affect, oriented x 3, + pertinent 

finding (forgetful at times)


Skin:  normal color, warm/dry, no rash





Laboratory Results





Last 24 Hours








Test


  5/6/18


17:15 5/7/18


06:09


 


Prostate Specific Antigen 1.250 ng/ml  


 


Sodium Level  139 mmol/L 


 


Potassium Level  3.7 mmol/L 


 


Chloride Level  104 mmol/L 


 


Carbon Dioxide Level  30 mmol/L 


 


Anion Gap  5.0 mmol/L 


 


Blood Urea Nitrogen  16 mg/dl 


 


Creatinine  1.28 mg/dl 


 


Est Creatinine Clear Calc


Drug Dose 


  56.3 ml/min 


 


 


Estimated GFR (


American) 


  63.0 


 


 


Estimated GFR (Non-


American 


  54.4 


 


 


BUN/Creatinine Ratio  12.3 


 


Random Glucose  128 mg/dl 


 


Calcium Level  8.3 mg/dl 


 


Phosphorus Level  3.1 mg/dl 


 


Magnesium Level  2.2 mg/dl 











Assessment and Plan


77-year-old male admitted because was found wandering the streets.





Dementia- intolerant to Namenda or Donepezil:


- Admitted to med/surg


- Head CT unremarkable for acute intracranial findings


- No s/s of infection; CXR and UCx w/out acute infectious process, no fever or 

WBC


- PT/OT consulted- will need placement at discharge (wife currently in [place] 

and unsafe to be home alone)





Urinary retention:


- IV Rocephin for prophylaxis pending UCx- UCx w/out growth, will discontinue 

abx today 


- PSA- 1.25


- Continue Flomax


- Suarez placed- will do TOV tomorrow 





HTN- UNCONTROLLED: 


- Norvasc 5 mg increased to 10 mg daily 


- Continue Lasix 40 mg daily 


- IV Hydralazine PRN





Hypothyroidism- TSH 2.8: Continue Synthroid





HLD: Continue Lipitor





DVT prophylaxis: Ambulation 





Code status: LEVEL I, FULL 





Dispo: From home, lives w/ wife (currently out of town)- stable for discharge 

pending placement- PT/OT and CM following

## 2018-05-08 VITALS
OXYGEN SATURATION: 95 % | TEMPERATURE: 98.42 F | SYSTOLIC BLOOD PRESSURE: 162 MMHG | DIASTOLIC BLOOD PRESSURE: 95 MMHG | HEART RATE: 77 BPM

## 2018-05-08 VITALS
SYSTOLIC BLOOD PRESSURE: 170 MMHG | DIASTOLIC BLOOD PRESSURE: 85 MMHG | TEMPERATURE: 98.06 F | OXYGEN SATURATION: 94 % | HEART RATE: 59 BPM

## 2018-05-08 VITALS — DIASTOLIC BLOOD PRESSURE: 80 MMHG | SYSTOLIC BLOOD PRESSURE: 141 MMHG | HEART RATE: 59 BPM

## 2018-05-08 VITALS
OXYGEN SATURATION: 96 % | SYSTOLIC BLOOD PRESSURE: 132 MMHG | DIASTOLIC BLOOD PRESSURE: 74 MMHG | TEMPERATURE: 97.52 F | HEART RATE: 63 BPM

## 2018-05-08 RX ADMIN — FUROSEMIDE SCH MG: 40 TABLET ORAL at 09:33

## 2018-05-08 RX ADMIN — AMLODIPINE BESYLATE SCH MG: 5 TABLET ORAL at 09:33

## 2018-05-08 RX ADMIN — TAMSULOSIN HYDROCHLORIDE SCH MG: 0.4 CAPSULE ORAL at 09:34

## 2018-05-08 RX ADMIN — ACETAMINOPHEN PRN MG: 325 TABLET ORAL at 13:49

## 2018-05-08 RX ADMIN — ATORVASTATIN CALCIUM SCH MG: 20 TABLET, FILM COATED ORAL at 09:34

## 2018-05-08 NOTE — PSYCHIATRIC CONSULTATION
Consultation


Date of Consultation


May 8, 2018.





Identifying Data





Russell Alcocer is a 77-year-old male admitted on May 6, 2018 at 08:25 who has a 

history of dementia, lives in [place] with his wife, and was admitted to 

the hospitalist service after he was found wandering in the street confused.  

Psychiatry is consulted for "psychosis, dementia, anxious, aggressive, wants to 

leave."





Chief Complaint


"Where was I?  I was thinking I was in [place] ".





History of Present Illness


Per records, the patient presented to the emergency room 5/5/2018 after he was 

found wandering in the street.  His wife spoke to ER staff and informed them 

that he has dementia, and that she was in [place] for a few weeks, but had 

cameras throughout the house to watch the patient.  He goes to the [employer] daily, and her brother-in-law checks up on him daily and 

delivers meals.  His wife stated that at baseline he knows his name and where 

he lives.  In the ER, he denied any problems, did not remember wandering in the 

street, and said he thought his wife was in [place].  He had a head CT which 

showed no acute abnormalities, and was compared to a brain MRI from 2/2/2018.  

Chest x-ray was normal, EKG was normal sinus rhythm, CBC showed slightly low 

red blood cells and hematocrit, CMP showed elevated creatinine kinase of 340 

but otherwise normal, PSA and TSH were normal, and urinalysis had moderate 

leukocyte esterase and greater than 30 white cells.  He was started on 

antibiotics, and urine culture showed 3 types of organisms present, all low 

counts probable skin celia.  He is demonstrated severely impaired memory since 

admission, could not remember what he had eaten just prior to assessments, or 

where his wife was, even shortly after staff reminded him.  Last evening, he 

became agitated and wanted to leave.  Primary attending met with him and he 

calmed down.  He also accepted 5 mg of Haldol orally.  Psychiatry was consulted 

at that time.





Patient was seen today with Romain Whittaker, MS 4.  He is of limited historian 

due to his dementia, does not recall the events of last evening, and does not 

remember when he came to the hospital or why.  He admits to being confused, 

states he was thinking that he had been in [place], and that his wife was in 

[place].  He has word finding difficulties, and becomes frustrated at times 

with his cognitive dysfunction.  He knows we are in [place], but does not 

know the town, hospital, day, or date.  He states he lives with his wife, Sandy John, and is not sure where she is right now.  He says that his sister and her 

 live nearby, and were either coming to see him at his home, or he was 

staying with them while his wife is away.  He later references of brother, and 

cannot clarify if it was a sister or brother that has been helping him out.  He 

denies mood problems, anxiety, hallucinations, paranoia, suicidal thoughts, 

homicidal thoughts, and states he feels safe in the hospital.  He reports good 

sleep and appetite.  He seems confused when asked about his dementia history, 

stating that he thinks he was told he had problems with memory, but cannot 

remember much else about it.





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  He 

saw Dr. Douglas once in January 2018, and had previously seen a neurologist in 

[place] that had diagnosed dementia and tried him on donepezil and memantine, both 

of which were poorly tolerated due to anger.  He had progressive memory loss, 

irritability, and cognitive impairment over the past couple of years, and the 

differential diagnosis included Alzheimer's or frontotemporal dementia.  He 

scored 17 out of 30 on the MMSE.  A brain MRI was ordered and performed in 

February 2018; it showed no acute intracranial findings or mass, no evidence of 

infarction, but minor foci of increased T2 signal within the white matter, 

likely on a small vessel basis.





Past Psychiatric History


Current OP Treatment:  no current treatment


Prior OP Treatment:  no prior treatment (That he can recall, and no mental 

health history noted in the EMR)


Prior Psych Hospitalizations:  none


Suicide Attempts:  No





Past Medical/Surgical History





(1) Dementia





Allergies


Allergies:  


Coded Allergies:  


     No Known Allergies (Unverified , 5/5/18)





Home Medications


Scheduled


Amlodipine (Norvasc), 5 MG PO DAILY


Atorvastatin (Lipitor), 20 MG PO DAILY


Furosemide (Lasix), 40 MG PO DAILY


Tamsulosin Hcl (Flomax), 0.4 MG PO DAILY





Family History





Patient reports no known family medical history.


Multiple family members with dementia.





Smoking Use


Smoking Status:  Unknown if Ever Smoked





Substance History


Unknown.





Personal History


Lives in:  [place] with wife


Education:  advanced degree (PhD in geology, postop at [employer])


Relationship History:  


Children:  unsure how many children he has





Examination


Vital Signs





Vital Signs Past 12 Hours








  Date Time  Temp Pulse Resp B/P (MAP) Pulse Ox O2 Delivery O2 Flow Rate FiO2


 


5/8/18 10:25  59 18 141/80 (100)    


 


5/8/18 09:36 36.7 59 16 170/85 (113) 94 Room Air  


 


5/8/18 09:00      Room Air  











Mental Examination


During interview pt is:  alert and oriented (to self and state, but not time or 

town, hospital)


Appearance:  appropriately dressed, appropriately groomed


Eye contact is:  good


Motor behavior is:  no abnormal motor movements


Speech:  normal in rate, rhythm & volume


Affect:  euthymic, other (Frustrated when experiences word finding difficulties 

and memory problems.)


Mood is:  other ("Good")


Thought process:  goal directed


Thought content:  reality based without delusions


Suicidal thought are:  denied


Homicidal thoughts are:  denied


Hallucinations:  denies auditory, denies visual


Cognition:  other (Memory and language impaired)


Intelligence estimated to be:  average


Insight:  impaired


Judgement:  impaired





Impression / Recommendations


Impression


77-year-old  male who lives in [place] with his wife, who is 

apparently on vacation in [place], has a history of dementia and no psychiatric 

history, and was found wandering in the street and confused.  He is admitted 

medically, and psychiatry was consulted after an episode of agitation last 

evening where he wanted to leave.





Recommendations





(1) Dementia


-Defer management of dementia to his neurologist.  We will get a repeat MMSE to 

compare to his score several months ago of 17/30.  He may benefit from frequent 

reorientation, reminders about why he is here, writing down the treatment 

recommendations and plan of care so that he can remind himself, and contact 

with his family to assist in discharge planning.


-Avoid deliriogenic medications given his high risk with dementia and 

hospitalization.


-No evidence of a mental illness, therefore is not committable and mental 

health treatment is not indicated.


-It is up to the primary attending to determine the patient's capacity to leave 

[place], and his dementia certainly impairs his ability to give informed consent or 

make treatment decisions.  Currently, he is willing to stay in the hospital and 

is calm and cooperative.


-No further psychiatric recommendations, we will sign off.

## 2018-05-08 NOTE — HOSPITALIST PROGRESS NOTE
Hospitalist Progress Note


Date of Service


May 8, 2018.





Subjective


Pt evaluation today including:  conversation w/ patient, physical exam, lab 

review, review of inpatient medication list


Voiding:  suarez catheter in place


Patient resting in bed. Polite and cooperative. 


Eating and drinking OK. 


Code gray last evening- Zyprexa PRN ordered for agitation. 


States he is forgetful of why he is here- discussed situation. In 

understanding. 


Alert/oriented but forgetful. 





Patient denies any fever, chills, sweats, lightheadedness, dizziness, vision 

changes, CP, palpitations, edema, SOB, wheezing, cough, abdominal pain, nausea, 

vomiting, diarrhea, urinary symptoms, melena, numbness/tingling, weakness, 

muscle/joint pain, anxiety/depression, active bleeding, or new skin 

discoloration/changes.





Medications





Current Inpatient Medications








 Medications


  (Trade)  Dose


 Ordered  Sig/Elder


 Route  Start Time


 Stop Time Status Last Admin


Dose Admin


 


 Acetaminophen


  (Tylenol Tab)  650 mg  Q4H  PRN


 PO  5/5/18 23:15


 6/4/18 23:14  5/6/18 23:01


650 MG


 


 Magnesium


 Hydroxide


  (Milk Of


 Magnesia Susp)  30 ml  Q6H  PRN


 PO  5/5/18 23:15


 6/4/18 23:14   


 


 


 Polyethylene


  (Miralax Powder


 Packet)  17 gm  DAILY  PRN


 PO  5/5/18 23:15


 6/4/18 23:14   


 


 


 Ondansetron HCl


  (Zofran Inj)  4 mg  Q6H  PRN


 IV  5/5/18 23:15


 6/4/18 23:14   


 


 


 Atorvastatin


 Calcium


  (Lipitor Tab)  20 mg  DAILY


 PO  5/6/18 09:00


 6/5/18 08:59  5/8/18 09:34


20 MG


 


 Furosemide


  (Lasix Tab)  40 mg  DAILY


 PO  5/6/18 09:00


 6/5/18 08:59  5/8/18 09:33


40 MG


 


 Tamsulosin HCl


  (Flomax Cap)  0.4 mg  DAILY


 PO  5/6/18 09:00


 6/5/18 08:59  5/8/18 09:34


0.4 MG


 


 Miscellaneous


  (Iv Fluids


 Completed)  1 ea  PRN  PRN


 N/A  5/5/18 23:45


 5/5/19 23:44   


 


 


 Hydralazine HCl


  (HydrALAZINE INJ)  20 mg  Q6  PRN


 IV.  5/6/18 07:45


 6/5/18 07:44   


 


 


 Amlodipine


 Besylate


  (Norvasc Tab)  10 mg  DAILY


 PO  5/7/18 09:00


 6/5/18 08:59  5/8/18 09:33


10 MG


 


 Olanzapine


  (Zyprexa Zydis


 Od Tab)  5 mg  BID  PRN


 PO  5/7/18 17:00


 6/6/18 16:59   


 











Objective


Vital Signs











  Date Time  Temp Pulse Resp B/P (MAP) Pulse Ox O2 Delivery O2 Flow Rate FiO2


 


5/8/18 10:25  59 18 141/80 (100)    


 


5/8/18 09:36 36.7 59 16 170/85 (113) 94 Room Air  


 


5/8/18 09:00      Room Air  


 


5/7/18 23:30     97 Room Air  


 


5/7/18 23:17 36.4 53 18 150/83 (105) 97 Room Air  


 


5/7/18 15:40      Room Air  


 


5/7/18 15:00 37.0 65 18 121/70 (87) 94 Room Air  











Physical Exam


General Appearance:  no apparent distress


Eyes:  normal inspection, PERRL


ENT:  hearing grossly normal


Neck:  supple


Respiratory/Chest:  lungs clear, no respiratory distress, no accessory muscle 

use


Cardiovascular:  regular rate, rhythm


Abdomen:  normal bowel sounds, non tender, soft


Extremities:  no pedal edema, no calf tenderness


Neurologic/Psychiatric:  alert, normal mood/affect, oriented x 3


Skin:  normal color, warm/dry, no rash





Assessment and Plan


77-year-old male admitted because was found wandering the streets.





Dementia- intolerant to Namenda or Donepezil- STABLE:


- Admitted to med/surg


- Head CT unremarkable for acute intracranial findings


- No s/s of infection; CXR and UCx w/out acute infectious process, no fever or 

WBC


- PT/OT consulted- will need placement at discharge (wife currently in [place] 

and unsafe to be home alone)


- Zyprexa 5 mg BID PRN for agitation 


- Supportive care 


- Psychiatry consulted- no further recommendations, signed-off





Urinary retention:


- IV Rocephin for prophylaxis pending UCx- UCx w/out growth- abx discontinued 


- PSA- 1.25


- Continue Flomax


- Suarez placed- TOV today prior to hopeful discharge tomorrow- bladder scan and 

straight cath PRN for residual scan >300 ml 





HTN- STABLE: 


- Norvasc 5 mg increased to 10 mg daily 


- Continue Lasix 40 mg daily 


- IV Hydralazine PRN





Hypothyroidism- TSH 2.8: Continue Synthroid





HLD: Continue Lipitor





DVT prophylaxis: Ambulation 





Code status: LEVEL I, FULL 





Dispo: From home, lives w/ wife (currently out of town)- hopeful discharge to 

[facility] tomorrow- PT/OT and CM following

## 2018-05-08 NOTE — PSYCHIATRIC HISTORY & PHYSICAL
History


Date of Service


May 8, 2018.





Identifying Data








Chief Complaint


"Where was I?  I was thinking I was in [place] ".





History of Present Illness


Per records, the patient presented to the emergency room 5/5/2018 after he was 

found wandering in the street.  His wife spoke to ER staff and informed them 

that he has dementia, and that she was in [place] for a few weeks, but had 

cameras throughout the house to watch the patient.  He goes to the [facility] daily, and her brother-in-law checks up on him daily and 

delivers meals.  His wife stated that at baseline he knows his name and where 

he lives.  In the ER, he denied any problems, did not remember wandering in the 

street, and said he thought his wife was in [place].  He had a head CT which 

showed no acute abnormalities, and was compared to a brain MRI from 2/2/2018.  

Chest x-ray was normal, EKG was normal sinus rhythm, CBC showed slightly low 

red blood cells and hematocrit, CMP showed elevated creatinine kinase of 340 

but otherwise normal, PSA and TSH were normal, and urinalysis had moderate 

leukocyte esterase and greater than 30 white cells.  He was started on 

antibiotics, and urine culture showed 3 types of organisms present, all low 

counts probable skin celia.  He is demonstrated severely impaired memory since 

admission, could not remember what he had eaten just prior to assessments, or 

where his wife was, even shortly after staff reminded him.  Last evening, he 

became agitated and wanted to leave.  Primary attending met with him and he 

calmed down.  He also accepted 5 mg of Haldol orally.  Psychiatry was consulted 

at that time.





Patient was seen today with Romain Whittaker, MS 4.  He is of limited historian 

due to his dementia, does not recall the events of last evening, and does not 

remember when he came to the hospital or why.  He admits to being confused, 

states he was thinking that he had been in [place], and that his wife was in 

[place].  He has word finding difficulties, and becomes frustrated at times 

with his cognitive dysfunction.  He knows we are in [place], but does not 

know the town, hospital, day, or date.  He states he lives with his wife, Sandy John, and is not sure where she is right now.  He says that his sister and her 

 live nearby, and were either coming to see him at his home, or he was 

staying with them while his wife is away.  He later references of brother, and 

cannot clarify if it was a sister or brother that has been helping him out.  He 

denies hallucinations, paranoia, suicidal thoughts, homicidal thoughts, and 

states he feels safe in the hospital.  He reports good sleep and appetite.  He 

seems confused when asked about his dementia history, stating that he thinks he 

was told he had problems with memory, but cannot remember much else about it.





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  He 

saw Dr. Douglas once in January 2018, and had previously seen a neurologist in 

[place] that had diagnosed dementia and tried him on donepezil and memantine, both 

of which were poorly tolerated due to anger.  He had progressive memory loss, 

irritability, and cognitive impairment over the past couple of years, and the 

differential diagnosis included Alzheimer's or frontotemporal dementia.  A 

brain MRI was ordered and performed in February 2018; it showed no acute 

intracranial findings or mass, no evidence of infarction, but minor foci of 

increased T2 signal within the white matter, likely on a small vessel basis.





Past Psychiatric History


Current OP Treatment:  no current treatment


Prior OP Treatment:  no prior treatment (That he can recall, and no mental 

health history noted in the EMR)


Prior Psych Hospitalizations:  none


Suicide Attempts:  No


Past Medication Trials


None known.





Past Medical/Surgical History





(1) Dementia





Allergies


Allergies:  


Coded Allergies:  


     No Known Allergies (Unverified , 5/5/18)





Home Medications


Scheduled


Amlodipine (Norvasc), 5 MG PO DAILY


Atorvastatin (Lipitor), 20 MG PO DAILY


Furosemide (Lasix), 40 MG PO DAILY


Tamsulosin Hcl (Flomax), 0.4 MG PO DAILY





Family History





Patient reports no known family medical history.


Patient poor historian.  Per outpatient records, there is a significant family 

history of dementia, and multiple siblings have dementia.





Smoking Use


Smoking Status:  Unknown if Ever Smoked





Personal History


Lives in:  [place]


Education:  advanced degree (PhD in geology, postop at [employer])


Relationship History:  


Children:  unsure how many children he has





Review of Systems


Denies pain, any physical concerns





Examination


Physical Examination


A physical exam was performed in the ER [on the medical floor] prior to 

admission to the unit by [ ].  I accept that physical as correct/medical 

clearance for the inpatient physical exam.





Vital Signs





Vital Signs Past 12 Hours








  Date Time  Temp Pulse Resp B/P (MAP) Pulse Ox O2 Delivery O2 Flow Rate FiO2


 


5/8/18 10:25  59 18 141/80 (100)    


 


5/8/18 09:36 36.7 59 16 170/85 (113) 94 Room Air  


 


5/8/18 09:00      Room Air  











Mental Examination


During interview pt is:  alert and oriented (to self and state, but not time or 

town, hospital)





Impression / Recommendations


CPT Code


Initial Hospital Care:  98576

## 2018-05-09 VITALS
TEMPERATURE: 97.7 F | DIASTOLIC BLOOD PRESSURE: 83 MMHG | SYSTOLIC BLOOD PRESSURE: 156 MMHG | OXYGEN SATURATION: 96 % | HEART RATE: 62 BPM

## 2018-05-09 VITALS
TEMPERATURE: 97.7 F | OXYGEN SATURATION: 96 % | DIASTOLIC BLOOD PRESSURE: 83 MMHG | HEART RATE: 62 BPM | SYSTOLIC BLOOD PRESSURE: 156 MMHG

## 2018-05-09 VITALS — HEART RATE: 69 BPM | SYSTOLIC BLOOD PRESSURE: 151 MMHG | DIASTOLIC BLOOD PRESSURE: 79 MMHG

## 2018-05-09 RX ADMIN — FUROSEMIDE SCH MG: 40 TABLET ORAL at 08:46

## 2018-05-09 RX ADMIN — TAMSULOSIN HYDROCHLORIDE SCH MG: 0.4 CAPSULE ORAL at 08:46

## 2018-05-09 RX ADMIN — ATORVASTATIN CALCIUM SCH MG: 20 TABLET, FILM COATED ORAL at 08:45

## 2018-05-09 RX ADMIN — AMLODIPINE BESYLATE SCH MG: 5 TABLET ORAL at 08:45

## 2018-05-09 NOTE — DISCHARGE SUMMARY
Discharge Summary


Date of Service


May 9, 2018.





Discharge Summary


Admission Date:


May 6, 2018 at 08:25


Discharge Date:  May 9, 2018


Discharge Disposition:  Skilled nursing facility


Principal Diagnosis:  Dementia


Problems/Secondary Diagnoses:


Urinary retention


HTN


Hypothyroidism


HLD


Immunizations:  


   Have You Had Influenza Vaccine:  Unknown


   History of Tetanus Vaccine?:  Unknown


   History of Pneumococcal:  Unknown


   History of Hepatitis B Vaccine:  Unknown


Procedures:


HEAD WITHOUT CONTRAST (CT)





CLINICAL HISTORY: 77 years-old Male presenting with AMS. 





TECHNIQUE: Multidetector CT imaging of the head was performed without the use of


intravenous contrast. IV contrast: None. A dose lowering technique was used


consistent with the principles of ALARA (as low as reasonably achievable). 





COMPARISON: MR brain from 2/2/2018.





CT DOSE (mGy.cm): The estimated cumulative dose is 687.98 mGy.cm.





FINDINGS: 





 topogram: Unremarkable.





Proportional ventricular and sulcal prominence, likely age-related parenchymal


volume loss. Brain parenchyma normal in appearance with preserved gray-white


differentiation. No mass effect or midline shift. No hemorrhage or acute


territorial infarct. No extra-axial fluid collection. Paranasal sinuses and


mastoid air cells clear. Calvarium intact.    





IMPRESSION:


1.  No acute intracranial abnormality. 











Electronically signed by:  Shaheed Richards M.D.


5/5/2018 7:51 PM





Dictated Date/Time:  5/5/2018 7:49 PM





 The status of this report is Signed.   


 Draft = Not yet reviewed or approved by Radiologist.  


Signed = Reviewed and approved by Radiologist





CHEST 2 VIEWS ROUTINE





CLINICAL HISTORY: 77 years-old Male presenting with AMS. 





TECHNIQUE: PA and lateral views of the chest were obtained.





COMPARISON: None.





FINDINGS:


Atherosclerosis of the aortic arch. Tortuosity of the descending thoracic aorta.


Cardiac silhouette normal in size. Lungs and pleural spaces clear. Degenerative


changes of the thoracic spine. Upper abdomen normal.





IMPRESSION:


1.  No acute cardiopulmonary disease.











Electronically signed by:  Shaheed Richards M.D.


5/5/2018 9:05 PM





Dictated Date/Time:  5/5/2018 9:04 PM





 The status of this report is Signed.   


 Draft = Not yet reviewed or approved by Radiologist.  


Signed = Reviewed and approved by Radiologist


Consultations:


Psychiatry- Dr. Isaacs





Medication Reconciliation


New Medications:  


Amlodipine Besylate (Amlodipine Besylate) 5 Mg Tab


10 MG PO DAILY for 30 Days, #60 TAB





Olanzapine (Olanzapine Odt) 5 Mg Sol


5 MG PO BID PRN for anxiety, combative for 14 Days





 


Continued Medications:  


Atorvastatin (Lipitor) 20 Mg Tab


20 MG PO DAILY





Furosemide (Lasix) 40 Mg Tab


40 MG PO DAILY





Tamsulosin Hcl (Flomax) 0.4 Mg Cap


0.4 MG PO DAILY





 


Discontinued Medications:  


Amlodipine (Norvasc) 5 Mg Tab


5 MG PO DAILY











Discharge Exam


Review of Systems:  


   Constitutional:  No fever, No chills, No sweats, No weakness, No fatigue


   Eyes:  No worsening of vision


   ENT:  No hearing loss


   Respiratory:  No cough, No shortness of breath, No hemoptysis


   Cardiovascular:  No chest pain, No edema, No palpitations


   Abdomen:  No pain, No nausea, No vomiting, No diarrhea, No constipation, No 

GI bleeding


   Musculoskeletal:  No joint pain, No muscle pain, No swelling, No calf pain


   Genitourinary - Male:  No hematuria, No dysuria, No urinary retention


   Neurologic:  + memory loss, No weakness, No numbness/tingling


   Psychiatric:  No depression symptoms, No anxiety


   Endocrine:  No fatigue


   Hematologic / Lymphatic:  No abnormal bleeding/bruising


   Integumentary:  No rash, No itch, No new/changing skin lesions


Physical Exam:  


   General Appearance:  no apparent distress


   Eyes:  normal inspection, PERRL


   ENT:  hearing grossly normal


   Neck:  supple


   Respiratory/Chest:  lungs clear, no respiratory distress, no accessory 

muscle use


   Cardiovascular:  regular rate, rhythm


   Abdomen / GI:  normal bowel sounds, non tender, soft


   Extremities:  no calf tenderness, no pedal edema


   Neurologic/Psychiatric:  alert, normal mood/affect, oriented x 3, + 

disoriented (very forgetful at times), + pertinent finding (pleasant/cooperative

)


   Skin:  normal color, warm/dry, no rash





Hospital Course


77-year-old male admitted because was found wandering the streets.





Dementia- intolerant to Namenda or Donepezil- STABLE:


- Admitted to med/surg


- Head CT unremarkable for acute intracranial findings


- No s/s of infection; CXR and UCx w/out acute infectious process, no fever or 

WBC


- PT/OT consulted- will need placement at discharge (wife currently in [place] 

and unsafe to be home alone)


- Zyprexa 5 mg BID PRN for agitation 


- Supportive care 


- Psychiatry consulted- no further recommendations





Urinary retention:


- IV Rocephin for prophylaxis pending UCx- UCx w/out growth- abx discontinued 


- PSA- 1.25


- Continue Flomax


- Harden placed- TOV prior to discharge- no issues





HTN- STABLE: 


- Norvasc 5 mg increased to 10 mg daily 


- Continue Lasix 40 mg daily 


- IV Hydralazine PRN





Hypothyroidism- TSH 2.8: Continue Synthroid





HLD: Continue Lipitor





DVT prophylaxis: Ambulation 





Code status: LEVEL I, FULL 





Dispo: Discharge to [facility]


Total Time Spent:  Greater than 30 minutes


This includes examination of the patient, discharge planning, medication 

reconciliation, and communication with other providers.





Discharge Instructions


Please refer to the electronic Patient Visit Report (Discharge Instructions) 

for additional information.





Follow-Up


Follow-up with [facility] provider within 24-48 hours 





Please follow-up with your PCP within 5-7 days after discharge from [facility]





Please follow-up/keep all of your subspecialty appointments





Additional Copies To


Abundio Frost D.O.

## 2018-05-09 NOTE — DISCHARGE INSTRUCTIONS
Discharge Instructions


Date of Service


May 9, 2018.





Admission


Reason for Admission:  Dementia





Discharge


Discharge Diagnosis / Problem:  Dementia





Discharge Goals


Goal(s):  Decrease discomfort, Improve function, Increase independence, Improve 

disease control, Learn about illness, Diagnostic testing, Therapeutic 

intervention, Prevent Disease Progression





Activity Recommendations


Activity Level:  Up Ad Lisy





.





Additional Information


Patient informed of condition:  Yes


Advance Directives:  No


DNR:  No


Level of Care:  Skilled


Communicable Disease:  No


Prognosis:  Stable


Harden Catheter:  No





Instructions / Follow-Up


Instructions / Follow-Up


77-year-old male admitted because was found wandering the streets.





Dementia- intolerant to Namenda or Donepezil- STABLE:


- Head CT unremarkable for acute intracranial findings


- No s/s of infection; CXR and UCx w/out acute infectious process, no fever or 

WBC


- PT/OT consulted- will need placement at discharge (wife currently in [place] 

and unsafe to be home alone)


- Zyprexa 5 mg BID PRN for agitation 


- Supportive care 


- Psychiatry consulted- no further recommendations





Urinary retention:


- IV Rocephin for prophylaxis pending UCx- UCx w/out growth- abx discontinued 


- PSA- 1.25


- Continue Flomax


- Harden placed- TOV prior to discharge- no issues 





HTN- STABLE: Norvasc 10 mg daily, Lasix 40 mg daily 





Hypothyroidism- TSH 2.8: Continue Synthroid





HLD: Continue Lipitor





DVT prophylaxis: Ambulation 





Code status: LEVEL I, FULL 





Dispo: Discharge to [facility]





FOLLOW-UPS:





Follow-up with [facility] provider within 24-48 hours 





Please follow-up with your PCP within 5-7 days after discharge from [facility]





Please follow-up/keep all of your subspecialty appointments





Current Hospital Diet


Patient's current hospital diet: Regular Diet





Discharge Diet


Recommended Diet:  Regular Diet





Pending Studies


Studies pending at discharge:  no





Physician Orders On Transfer


Special Precautions:


Fall precautions


Dressing Changes:


None


IV Therapy:


None


Vital Signs:


Routine


POLST Discussion:  without POLST completion





Medical Emergencies








.


Who to Call and When:





Medical Emergencies:  If at any time you feel your situation is an emergency, 

please call 911 immediately.





.





Non-Emergent Contact


Non-Emergency issues call your:  Primary Care Provider


Call Non-Emergent contact if:  you have any medication questions





.


.








"Provider Documentation" section prepared by Supriya Joyner.








.





Core Measure Problem


Core Measures:  None